# Patient Record
(demographics unavailable — no encounter records)

---

## 2024-10-24 NOTE — PHYSICAL EXAM
[General Appearance - Well Developed] : well developed [Normal Appearance] : normal appearance [Well Groomed] : well groomed [General Appearance - Well Nourished] : well nourished [No Deformities] : no deformities [General Appearance - In No Acute Distress] : no acute distress [III] : III [Neck Appearance] : the appearance of the neck was normal [Apical Impulse] : the apical impulse was normal [Heart Sounds] : normal S1 and S2 [Murmurs] : no murmurs [Abnormal Walk] : normal gait [Nail Clubbing] : no clubbing of the fingernails [Cyanosis, Localized] : no localized cyanosis [Petechial Hemorrhages (___cm)] : no petechial hemorrhages [] : no ischemic changes [Skin Color & Pigmentation] : normal skin color and pigmentation [Oriented To Time, Place, And Person] : oriented to person, place, and time

## 2024-10-28 NOTE — HISTORY OF PRESENT ILLNESS
[FreeTextEntry1] : 1/25/24: Initial visit for this 59-year-old gentleman for both cough and sleep evaluation. He has had an annoying nonproductive cough since November. This began with upper respiratory tract infection symptoms. He denies wheezing, chest pain, or shortness of breath. No sx of GERD. There is no history of asthma, has never smoked. No recent CXR. Saw ENT (Dr. Sebastian) yesterday for nasal congestion, as well as sleep complaints. Started on Flonase. 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 Told of severe snoring and witnessed apnea. Denies AM headache, parasomnia. Mild excessive daytime somnolence with [place] sleepiness scale (out of 24 points) = 5, no problems driving.  4/9/24: Reviewed sleep study with patient, severe sleep disordered breathing, apnea-hypopnea index 71  7/25/24: Doing very well with CPAP, snoring resolved and daytime sleepiness much better. He is on a CPAP machine minimum 6, maximum 18 810 ResMed machine. Examining it today it shows average use of 7 hours and 36 minutes over the past 30 days, 27 of the past 30 days for more than 4 hours, apnea-hypopnea index on treatment 2.0. Minimal leak.  10/24/24: Pt. has been using CPAP since April 2024, pt. states that he slept well for the first few months. Pt. now complains of burning in nose since his last visit on 7/25/24. He is also starting to feel some fatigue and daytime sleepiness while driving for the past 2 months ago. Pt. also states that when he would take CPAP off to go to bathroom, that pressure would decrease at the beginning but now the pressure remains the same and that he can't adjust humidifier setting anymore. Machine was checked at this visit to confirm proper working conditions.   Pt. has not received any supplies from [employer] since he received his CPAP machine, telephone number given to pt. to contact DME to ask for supplies and new mask.  AHI 2.6 for time frame 9/24/24 - 10/23/24

## 2024-10-28 NOTE — HISTORY OF PRESENT ILLNESS
[FreeTextEntry1] : 1/25/24: Initial visit for this 59-year-old gentleman for both cough and sleep evaluation. He has had an annoying nonproductive cough since November. This began with upper respiratory tract infection symptoms. He denies wheezing, chest pain, or shortness of breath. No sx of GERD. There is no history of asthma, has never smoked. No recent CXR. Saw ENT (Dr. Sebastian) yesterday for nasal congestion, as well as sleep complaints. Started on Flonase. 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 Told of severe snoring and witnessed apnea. Denies AM headache, parasomnia. Mild excessive daytime somnolence with [place] sleepiness scale (out of 24 points) = 5, no problems driving.  4/9/24: Reviewed sleep study with patient, severe sleep disordered breathing, apnea-hypopnea index 71  7/25/24: Doing very well with CPAP, snoring resolved and daytime sleepiness much better. He is on a CPAP machine minimum 6, maximum 18 810 ResMed machine. Examining it today it shows average use of 7 hours and 36 minutes over the past 30 days, 27 of the past 30 days for more than 4 hours, apnea-hypopnea index on treatment 2.0. Minimal leak.  10/24/24: Pt. has been using CPAP since April 2024, pt. states that he slept well for the first few months. Pt. now complains of burning in nose since his last visit on 7/25/24. He is also starting to feel some fatigue and daytime sleepiness while driving for the past 2 months ago. Pt. also states that when he would take CPAP off to go to bathroom, that pressure would decrease at the beginning but now the pressure remains the same and that he can't adjust humidifier setting anymore. Machine was checked at this visit to confirm proper working conditions.   Pt. has not received any supplies from [employer] since he received his CPAP machine, telephone number given to pt. to contact DME to ask for supplies and new mask.  AHI 2.6 for time frame 9/24/24 - 10/23/24

## 2024-10-28 NOTE — ASSESSMENT
[FreeTextEntry1] : obstructive sleep apnea on CPAP  Doing very well with CPAP, excellent compliance and efficacy.  Benefiting from usage.  Given long term advice about CPAP use.  Call durable medical equipment provider if any equipment problems.  Replace interface as per schedule, generally at least every 3 months.  Stressed importance of CPAP compliance.  Return to see me in about 6 months if doing well. Showed how to change humidifier settings.  Given long term advice about CPAP use.  Call durable medical equipment  provider to get supplies and  if any equipment problems.  Replace interface as per schedule, generally at least every 3 months.  Stressed importance of CPAP compliance.  Return to see me in about 6 months if doing well.

## 2024-11-08 NOTE — WORK
[Sprain/Strain] : sprain/strain [Was the competent medical cause of the injury] : was the competent medical cause of the injury [Are consistent with the injury] : are consistent with the injury [Consistent with my objective findings] : consistent with my objective findings [Partial] : partial [Does not reveal pre-existing condition(s) that may affect treatment/prognosis] : does not reveal pre-existing condition(s) that may affect treatment/prognosis [No Rx restrictions] : No Rx restrictions. [Can return to work without limitations on ______] : can return to work without limitations on [unfilled]

## 2024-11-08 NOTE — PHYSICAL EXAM
[Right] : right shoulder [5 ___] : forward flexion 5[unfilled]/5 [5___] : external rotation 5[unfilled]/5 [] : no tenderness to palpation [FreeTextEntry9] : FE: R 150 ER: R 50

## 2024-11-08 NOTE — HISTORY OF PRESENT ILLNESS
[Work related] : work related [Sudden] : sudden [3] : 3 [Dull/Aching] : dull/aching [Throbbing] : throbbing [Intermittent] : intermittent [Rest] : rest [Meds] : meds [Lying in bed] : lying in bed [Light duty] : Work status: light duty [de-identified] : WC DOI 12/19/23:  for DAP  11/8/24: Here for follow up. He reports doing well. There is minimal pain. He would like to discuss returning to work.   9/27/24: Here for follow up.  He is doing well, he does an HEP.  He takes aleve BID.    8/2/24: Here for follow up. He reports minimal pain. He continues to hear/feel a cracking sound. He does HEP.   5/1/24:  Here for follow up.  He completed PT.  He feels improvement in pain, still with some clicking and stiffness.   3/20/24:  He is in PT with improvement.  He feels less pain   2/2/24:  Here for follow up.  He did 4 sessions of PT.    1/10/24: Here to review MRI. He took MDP with good relief.  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 He was seen at [facility] ER where he was given a toradol injection. There is pain with OH reaching. Pain is anterior and lateral and can be variable. Pain can shoot down the right arm with tingling. He has been taking aleve and Ibuprofen as well as using bengay patches.  No prior right shoulder injuries. He is working.  [] : no [FreeTextEntry1] : RT shoulder  [FreeTextEntry3] : 12/19/23 [de-identified] : certain motions

## 2024-11-08 NOTE — ASSESSMENT
[FreeTextEntry1] : R RTC calcific tendonitis and impingement with cervical radiculopathy and cervical ddd  MRI R shoulder shows partial supra and subscap tears, labral tearing, capsular thickening, partial biceps tearing, bursitis, AC joint arthrosis. Completed PT. HEP.  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 RTO 6-8 weeks.

## 2024-12-23 NOTE — ASSESSMENT
[FreeTextEntry1] : R RTC calcific tendonitis and impingement with cervical radiculopathy and cervical ddd  MRI R shoulder shows partial supra and subscap tears, labral tearing, capsular thickening, partial biceps tearing, bursitis, AC joint arthrosis. Completed PT. HEP.  He is working full duty. RTO prn.

## 2024-12-23 NOTE — WORK
[Sprain/Strain] : sprain/strain [Was the competent medical cause of the injury] : was the competent medical cause of the injury [Are consistent with the injury] : are consistent with the injury [Consistent with my objective findings] : consistent with my objective findings [Partial] : partial [Does not reveal pre-existing condition(s) that may affect treatment/prognosis] : does not reveal pre-existing condition(s) that may affect treatment/prognosis [Can return to work without limitations on ______] : can return to work without limitations on [unfilled] [No Rx restrictions] : No Rx restrictions.

## 2024-12-23 NOTE — PHYSICAL EXAM
[Right] : right shoulder [5 ___] : forward flexion 5[unfilled]/5 [5___] : external rotation 5[unfilled]/5 [] : strength is improving [FreeTextEntry9] : FE: R 150 ER: R 50

## 2024-12-23 NOTE — HISTORY OF PRESENT ILLNESS
[Work related] : work related [Sudden] : sudden [3] : 3 [Dull/Aching] : dull/aching [Throbbing] : throbbing [Intermittent] : intermittent [Rest] : rest [Meds] : meds [Lying in bed] : lying in bed [Light duty] : Work status: light duty [de-identified] :  DOI 12/19/23:  for DAP  12/23/24 - follow up for right shoulder. He is doing ok. He does HEP and is working.  11/8/24: Here for follow up. He reports doing well. There is minimal pain. He would like to discuss returning to work.   9/27/24: Here for follow up.  He is doing well, he does an HEP.  He takes aleve BID.    8/2/24: Here for follow up. He reports minimal pain. He continues to hear/feel a cracking sound. He does HEP.   5/1/24:  Here for follow up.  He completed PT.  He feels improvement in pain, still with some clicking and stiffness.   3/20/24:  He is in PT with improvement.  He feels less pain   2/2/24:  Here for follow up.  He did 4 sessions of PT.    1/10/24: Here to review MRI. He took MDP with good relief.  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 He was seen at [facility] ER where he was given a toradol injection. There is pain with OH reaching. Pain is anterior and lateral and can be variable. Pain can shoot down the right arm with tingling. He has been taking aleve and Ibuprofen as well as using bengay patches.  No prior right shoulder injuries. He is working.   12/23/24 pt is here for  follow up on rt shoulder, pt states he is doing better and has no more pain and is still working [] : no [FreeTextEntry1] : RT shoulder  [FreeTextEntry3] : 12/19/23 [de-identified] : certain motions

## 2025-07-29 NOTE — END OF VISIT
[Time Spent: ___ minutes] : I have spent [unfilled] minutes of time on the encounter which excludes teaching and separately reported services. [FreeTextEntry4] :  This note was written by Raad Sunshine on 07/29/2025 actively solely Lyle Cabral M.D. I, Raad Sunshine, am scribing for and in the presence of Lyle Cabral M.D. in the following sections HISTORY OF PRESENT ILLNESS, PAST MEDICAL/FAMILY/SOCIAL HISTORY; REVIEW OF SYSTEMS; VITAL SIGNS; PHYSICAL EXAM; ASSESSMENT/PLAN.     All medical record entries made by this scribe at my, Lyle Cabral M.D. direction and personally dictated by me on 07/29/2025. I personally performed the services described in the documentation, reviewed the documentation recorded by the scribe in my presence, and it accurately and completely records my words and actions.

## 2025-07-29 NOTE — HISTORY OF PRESENT ILLNESS
[FreeTextEntry1] : 10/19/2016: The pt is a 53 y/o male who present for a follow up for BPH. The last clinic visit was 3 months ago. Symptoms include frequency, weak stream, nocturia and incontinence. Onset was gradual 1 year ago. There is no known event that preceded symptom onset. Symptoms occur frequently during the day and at night. Pt describes this as mild and unchanged. Pt is no currently being treated for this problem. Pt was previously evaluated in this clinic. Previous presentation included frequency, weak stream, nocturia and incontinence. Past evaluation has included UA and culture. The is problem has no been previously treated  10/19/2016: He is here for uroflow and bladder scan  Urinary incontinence. The onset has been acute and has been occurring in a persistent pattern. The course has been constant. Urinary incontinence occurs both day and night. The type of incontinence is described as unknown. The urge to urinate is absent. There has been associated nocturia and urinary frequency.  03/01/2021: Pt is here today for follow up. Pt is doing well. Pt has increased frequency secondary to increased fluid intake. Denies hematuria, dysuria, urgency and hesitancy. Good stream. PVR 17 cc.  O/E: circumcised phallus, adequate meatus, both testes descended, nontender, no mass palpable JENNI: negative  Will get UA and culture Will get PSA report Follow up in 2 weeks for renal sonogram  04/27/2021: Mr. MERINO is a 56 year male who presents today for a follow up. PSA on 01/28/2021: 0.7 ng/ml. UA: calcium oxalate crystals; Culture: negative. He is doing well. Pt notes frequency. He denies hematuria, dysuria, urgency and hesitancy.   Renal sonogram was done today in office. There is a 2.9 mm echogenic focus in the lower pole of the left kidney. The right kidney appears unremarkable. Both kidneys are normal in size and echogenicity without hydronephrosis or solid masses visualized   O/E: circumcised phallus, adequate meatus, both testes descended, nontender, no mass palpable JENNI: deferred  Follow up in 6 months for repeat renal sonogram uroflow and bladder scan   06/07/2022: Mr. MERINO is a 58 year male who presents today for a follow up for BPH and left renal stone.  Pt. is getting up q/hr probably secondary to excessive water drinking before going to sleep. He sleeps at 6:30 PM and gets up at 2:30 AM.  Advised to adjust water drinking accordingly to avoid interrupting sleep. PVR: 1 hr PVR 35 ml, indicating complete emptying of the bladder. Will drink less water before sleeping.   Renal sonogram was done today: shows no stone. Previously seen 2.1 mm stone in lower pole left kidney, not demonstrated.     RTC: 2 months to evaluate the effect of adjusting water drinking, Uroflow and bladder scan.     08/09/2022: Mr. MERINO is a 58 year male who presents today for a follow up for BPH and ED.   BPH: Patient is voiding and emptying bladder well. PVR: 37 mL. Urinary stream and flow is good. He is no longer drinking water before sleeping, and is sleeping better now. Not getting up to urinate during the night.    PSA:  1/28/2021:  0.7 ng/ml. 1/26/2022 : 0.6 ng/ mL   ED: Has desire and gets erections. Erections are soft and cannot keep erections for a long period of time. Will start Sildenafil 20 mg PO PRN  and follow up in 3 month to see effects of it.   RTC: 3 months : effects of Sildenafil     07/29/2025, 62 y/o male presents with a follow up visit for BPH and ED   BPH: No medications at present. patient is doing well, no urinary complaints at present.  Uroflow  	Maximum Flow 5.3	 	 		  	Average Flow	2.3	 	 		  	Voiding Time	18.2	 	 		  	Flow Time	17.2	 	 		  	Time to Max Flow 8.8	 	 	  	Voided Volume 40 ml          PVR 9 cc   Patient is voiding and emptying his bladder well.  PSA 1/28/2021:  0.7 ng/ml. 1/26/2022 : 0.6 ng/ mL  Stable PSA being drawn by his PCP   Patient advised to have his PSA drawn yearly  ED: On Sildenafil 20 MG. Doing well, patient is satisfied.  Will continue Sildenafil, Rx given   RTC PRN   [Urinary Urgency] : no urinary urgency [Urinary Frequency] : no urinary frequency [Nocturia] : no nocturia [Weak Stream] : no weak stream

## 2025-07-29 NOTE — PHYSICAL EXAM
[Normal Appearance] : normal appearance [Well Groomed] : well groomed [General Appearance - In No Acute Distress] : no acute distress [Edema] : no peripheral edema [Respiration, Rhythm And Depth] : normal respiratory rhythm and effort [Exaggerated Use Of Accessory Muscles For Inspiration] : no accessory muscle use [Abdomen Soft] : soft [Abdomen Tenderness] : non-tender [Costovertebral Angle Tenderness] : no ~M costovertebral angle tenderness [Urinary Bladder Findings] : the bladder was normal on palpation [Normal Station and Gait] : the gait and station were normal for the patient's age [] : no rash [No Focal Deficits] : no focal deficits [Oriented To Time, Place, And Person] : oriented to person, place, and time [Affect] : the affect was normal [Mood] : the mood was normal [No Palpable Adenopathy] : no palpable adenopathy [Chaperone Present] : A chaperone was present in the examining room during all aspects of the physical examination [FreeTextEntry2] :  Raad Sunshine

## 2025-07-29 NOTE — LETTER BODY
[Dear  ___] : Dear  [unfilled], [Consult Letter:] : I had the pleasure of evaluating your patient, [unfilled]. [Please see my note below.] : Please see my note below. [Consult Closing:] : Thank you very much for allowing me to participate in the care of this patient.  If you have any questions, please do not hesitate to contact me. [Sincerely,] : Sincerely, [FreeTextEntry3] : Lyle Zurita MD\par